# Patient Record
Sex: MALE | Race: WHITE | Employment: UNEMPLOYED | URBAN - METROPOLITAN AREA
[De-identification: names, ages, dates, MRNs, and addresses within clinical notes are randomized per-mention and may not be internally consistent; named-entity substitution may affect disease eponyms.]

---

## 2020-06-21 ENCOUNTER — APPOINTMENT (OUTPATIENT)
Dept: GENERAL RADIOLOGY | Age: 35
End: 2020-06-21

## 2020-06-21 ENCOUNTER — HOSPITAL ENCOUNTER (EMERGENCY)
Age: 35
Discharge: HOME | End: 2020-06-21
Attending: EMERGENCY MEDICINE

## 2020-06-21 VITALS
DIASTOLIC BLOOD PRESSURE: 79 MMHG | SYSTOLIC BLOOD PRESSURE: 125 MMHG | HEART RATE: 72 BPM | WEIGHT: 210 LBS | OXYGEN SATURATION: 99 % | HEIGHT: 67 IN | TEMPERATURE: 97.9 F | RESPIRATION RATE: 18 BRPM | BODY MASS INDEX: 32.96 KG/M2

## 2020-06-21 PROCEDURE — 73630 X-RAY EXAM OF FOOT: CPT

## 2020-06-21 PROCEDURE — 99283 EMERGENCY DEPT VISIT LOW MDM: CPT

## 2020-06-21 RX ORDER — CEPHALEXIN 250 MG/1
500 CAPSULE ORAL 3 TIMES DAILY
Qty: 42 CAPSULE | Refills: 0 | Status: SHIPPED | OUTPATIENT
Start: 2020-06-21 | End: 2020-06-28

## 2020-06-21 RX ORDER — SULFAMETHOXAZOLE AND TRIMETHOPRIM 800; 160 MG/1; MG/1
1 TABLET ORAL 2 TIMES DAILY
Qty: 20 TABLET | Refills: 0 | Status: SHIPPED | OUTPATIENT
Start: 2020-06-21 | End: 2020-07-01

## 2020-06-21 NOTE — ED PROVIDER NOTES
PERRLA. EOMI. No discharge from eyes. ENT:  No discharge from nose. OP clear. No tonsillar exudates. Neck:  Supple without lymphadenopathy. Pulmonary:   Non-labored breathing. Lungs CTAB. Cardiac:  Regular rate and rhythm. No murmurs, rubs, or gallops. Abdomen:  Soft. Non-tender. Non-distended. No rigidity/rebound/guarding  Musculoskeletal:  No long bone deformity. No CVA tenderness. Small area of erythema over the lateral dorsal right foot tracking down to a small area with minimal induration and fluctuance that is come to ahead. Vascular:  Extremities warm and perfused. Capillary refill <2seconds. Skin: As above  Neuro: Alert and oriented x 4. CN II-XII intact. 5/5 strength in all 4 distal extremities. Sensation grossly intact to light touch. Speech and mentation normal.  Extremities:  No peripheral edema. DiagnosticResults       RADIOLOGY:  XR FOOT RIGHT (MIN 3 VIEWS)   Final Result      No sign of any acute fracture or radiopaque foreign body. LABS:   No results found for this visit on 06/21/20. RECENT VITALS:  BP: 125/79, Temp: 97.9 °F (36.6 °C), Pulse: 72,Resp: 18, SpO2: 99 %     Procedures       ED Course     Nursing Notes, Past Medical Hx, Past Surgical Hx, Social Hx, Allergies, and Family Hx were reviewed. The patient was given the followingmedications:  Orders Placed This Encounter   Medications    sulfamethoxazole-trimethoprim (BACTRIM DS) 800-160 MG per tablet     Sig: Take 1 tablet by mouth 2 times daily for 10 days     Dispense:  20 tablet     Refill:  0    cephALEXin (KEFLEX) 250 MG capsule     Sig: Take 2 capsules by mouth 3 times daily for 7 days     Dispense:  42 capsule     Refill:  0       CONSULTS:  None    MEDICAL DECISION MAKING / ASSESSMENT / Xander Elicia is a 27 y.o. male who presented to the emergency department with Foot Injury   with a history and presentation as described above in HPI.  The patient was evaluated by myself and the ED

## 2020-06-21 NOTE — ED NOTES
Bed: A08-08  Expected date:   Expected time:   Means of arrival:   Comments:  Eliot Marks RN  06/21/20 1911

## 2020-10-15 ENCOUNTER — HOSPITAL ENCOUNTER (EMERGENCY)
Age: 35
Discharge: HOME | End: 2020-10-15
Attending: EMERGENCY MEDICINE

## 2020-10-15 VITALS
DIASTOLIC BLOOD PRESSURE: 73 MMHG | WEIGHT: 197 LBS | TEMPERATURE: 98.4 F | OXYGEN SATURATION: 100 % | SYSTOLIC BLOOD PRESSURE: 120 MMHG | BODY MASS INDEX: 30.92 KG/M2 | RESPIRATION RATE: 18 BRPM | HEART RATE: 83 BPM | HEIGHT: 67 IN

## 2020-10-15 LAB
ANION GAP SERPL CALCULATED.3IONS-SCNC: 8 MMOL/L (ref 3–16)
BASOPHILS ABSOLUTE: 0 K/UL (ref 0–0.2)
BASOPHILS RELATIVE PERCENT: 0.5 %
BUN BLDV-MCNC: 15 MG/DL (ref 7–20)
CALCIUM SERPL-MCNC: 9 MG/DL (ref 8.3–10.6)
CHLORIDE BLD-SCNC: 105 MMOL/L (ref 99–110)
CO2: 27 MMOL/L (ref 21–32)
CREAT SERPL-MCNC: 0.9 MG/DL (ref 0.9–1.3)
EOSINOPHILS ABSOLUTE: 0.2 K/UL (ref 0–0.6)
EOSINOPHILS RELATIVE PERCENT: 3.5 %
GFR AFRICAN AMERICAN: >60
GFR NON-AFRICAN AMERICAN: >60
GLUCOSE BLD-MCNC: 87 MG/DL (ref 70–99)
HCT VFR BLD CALC: 42.3 % (ref 40.5–52.5)
HEMOGLOBIN: 14.6 G/DL (ref 13.5–17.5)
LYMPHOCYTES ABSOLUTE: 1.3 K/UL (ref 1–5.1)
LYMPHOCYTES RELATIVE PERCENT: 20.7 %
MCH RBC QN AUTO: 29.4 PG (ref 26–34)
MCHC RBC AUTO-ENTMCNC: 34.6 G/DL (ref 31–36)
MCV RBC AUTO: 84.9 FL (ref 80–100)
MONOCYTES ABSOLUTE: 0.4 K/UL (ref 0–1.3)
MONOCYTES RELATIVE PERCENT: 6.1 %
NEUTROPHILS ABSOLUTE: 4.3 K/UL (ref 1.7–7.7)
NEUTROPHILS RELATIVE PERCENT: 69.2 %
PDW BLD-RTO: 12.6 % (ref 12.4–15.4)
PLATELET # BLD: 202 K/UL (ref 135–450)
PMV BLD AUTO: 8.7 FL (ref 5–10.5)
POTASSIUM REFLEX MAGNESIUM: 4.1 MMOL/L (ref 3.5–5.1)
RBC # BLD: 4.98 M/UL (ref 4.2–5.9)
SODIUM BLD-SCNC: 140 MMOL/L (ref 136–145)
WBC # BLD: 6.3 K/UL (ref 4–11)

## 2020-10-15 PROCEDURE — 80048 BASIC METABOLIC PNL TOTAL CA: CPT

## 2020-10-15 PROCEDURE — 99283 EMERGENCY DEPT VISIT LOW MDM: CPT

## 2020-10-15 PROCEDURE — 85025 COMPLETE CBC W/AUTO DIFF WBC: CPT

## 2020-10-15 RX ORDER — DOCUSATE SODIUM 100 MG/1
100 CAPSULE, LIQUID FILLED ORAL 2 TIMES DAILY
Qty: 30 CAPSULE | Refills: 0 | Status: SHIPPED | OUTPATIENT
Start: 2020-10-15

## 2020-10-15 ASSESSMENT — ENCOUNTER SYMPTOMS
SHORTNESS OF BREATH: 0
ANAL BLEEDING: 1
VOMITING: 0
NAUSEA: 0
ABDOMINAL PAIN: 0
BACK PAIN: 0
RECTAL PAIN: 1
DIARRHEA: 0
COLOR CHANGE: 0
EYE REDNESS: 0

## 2020-10-16 NOTE — ED PROVIDER NOTES
ED Attending Attestation Note     Date of evaluation: 10/15/2020    This patient was seen by the advance practice provider. I have seen and examined the patient, agree with the workup, evaluation, management and diagnosis. The care plan has been discussed. My assessment reveals with a thrombosed external hemorrhoid and no active bleeding.         Mei Veliz MD  10/15/20 [id number]

## 2020-10-16 NOTE — ED TRIAGE NOTES
Patient states that he has had bright red blood coming from his rectum and in his stool since bout 0100 today. Patient is hard to get a history from, but also admits to \"smoking a joint\" on his way here.

## 2020-10-16 NOTE — ED PROVIDER NOTES
[address] ENCOUNTER          PHYSICIAN ASSISTANT NOTE       Date of evaluation: 10/15/2020    Chief Complaint     Rectal Bleeding      History of Present Illness     Jimenez Rojas is a 38 y.o. male who presents with complaint of rectal bleeding. Patient states that yesterday he had a bowel movement and when he wiped he had a significant amount of blood and clots on the toilet paper. He states he was unable to see in the toilet because the toilet he was using had continuous flow and so all he saw was after he wiped and is unsure if the toilet contained blood. He states this occurred a second time after having a bowel movement and again. Today he was showering and he noticed an area that was swollen and painful near his anus. He has never noticed this in the past.  He states that he did not notice any pain with bowel movement yesterday and states he was not straining. Patient denies recent fevers or chills, abdominal pain, nausea or vomiting. He admits to frequent lightheadedness which has been going on for months and states that he is a  and gets spells of lightheadedness and sometimes has to sit down or lay down. He denies chest pain or shortness of breath. Review of Systems     Review of Systems   Constitutional: Negative for chills and fever. Eyes: Negative for redness. Respiratory: Negative for shortness of breath. Cardiovascular: Negative for chest pain. Gastrointestinal: Positive for anal bleeding and rectal pain. Negative for abdominal pain, diarrhea, nausea and vomiting. Musculoskeletal: Negative for back pain and myalgias. Skin: Negative for color change and wound. Neurological: Positive for light-headedness. Negative for weakness, numbness and headaches. Hematological: Does not bruise/bleed easily. Psychiatric/Behavioral: Negative for confusion. All other systems reviewed and are negative.       Past Medical, Surgical, Family, and Social History     He has a past medical history of Chlamydia, Drug abuse ([address] Hypertension, MRSA (methicillin resistant staph aureus) culture positive, and TB (tuberculosis). He has a past surgical history that includes hernia repair and Tonsillectomy. His family history is not on file. He reports that he has quit smoking. He has never used smokeless tobacco. He reports current alcohol use. He reports current drug use. Drugs: Marijuana, IV, Cocaine, and Methamphetamines. Medications     Previous Medications    No medications on file       Allergies     He is allergic to erythromycin and sulfa antibiotics. Physical Exam     INITIAL VITALS: BP: (!) 126/95, Temp: 98.4 °F (36.9 °C), Pulse: 83, Resp: 18, SpO2: 100 %  Physical Exam  Vitals signs and nursing note reviewed. Constitutional:       General: He is not in acute distress. Appearance: He is well-developed. He is not diaphoretic. HENT:      Head: Normocephalic and atraumatic. Mouth/Throat:      Mouth: Mucous membranes are dry. Eyes:      Conjunctiva/sclera: Conjunctivae normal.   Cardiovascular:      Rate and Rhythm: Normal rate and regular rhythm. Heart sounds: Normal heart sounds. Pulmonary:      Effort: Pulmonary effort is normal. No respiratory distress. Breath sounds: Normal breath sounds. Abdominal:      Palpations: Abdomen is soft. Tenderness: There is no abdominal tenderness. Genitourinary:     Rectum: Tenderness and external hemorrhoid (thrombosed hemorrhoid at the 9 o'clock position with tenderness within the anus adjacent) present. No mass. Normal anal tone. Comments: BRBPR noted on digital rectal exam  Musculoskeletal: Normal range of motion. General: No tenderness. Skin:     General: Skin is warm and dry. Neurological:      Mental Status: He is alert and oriented to person, place, and time. Psychiatric:         Behavior: Behavior normal.         Thought Content:  Thought content normal. Judgment: Judgment normal.         Diagnostic Results     RADIOLOGY:  No orders to display       LABS:   Results for orders placed or performed during the hospital encounter of 10/15/20   CBC Auto Differential   Result Value Ref Range    WBC 6.3 4.0 - 11.0 K/uL    RBC 4.98 4.20 - 5.90 M/uL    Hemoglobin 14.6 13.5 - 17.5 g/dL    Hematocrit 42.3 40.5 - 52.5 %    MCV 84.9 80.0 - 100.0 fL    MCH 29.4 26.0 - 34.0 pg    MCHC 34.6 31.0 - 36.0 g/dL    RDW 12.6 12.4 - 15.4 %    Platelets 826 827 - 118 K/uL    MPV 8.7 5.0 - 10.5 fL    Neutrophils % 69.2 %    Lymphocytes % 20.7 %    Monocytes % 6.1 %    Eosinophils % 3.5 %    Basophils % 0.5 %    Neutrophils Absolute 4.3 1.7 - 7.7 K/uL    Lymphocytes Absolute 1.3 1.0 - 5.1 K/uL    Monocytes Absolute 0.4 0.0 - 1.3 K/uL    Eosinophils Absolute 0.2 0.0 - 0.6 K/uL    Basophils Absolute 0.0 0.0 - 0.2 K/uL   Basic Metabolic Panel w/ Reflex to MG   Result Value Ref Range    Sodium 140 136 - 145 mmol/L    Potassium reflex Magnesium 4.1 3.5 - 5.1 mmol/L    Chloride 105 99 - 110 mmol/L    CO2 27 21 - 32 mmol/L    Anion Gap 8 3 - 16    Glucose 87 70 - 99 mg/dL    BUN 15 7 - 20 mg/dL    CREATININE 0.9 0.9 - 1.3 mg/dL    GFR Non-African American >60 >60    GFR African American >60 >60    Calcium 9.0 8.3 - 10.6 mg/dL       ED BEDSIDE ULTRASOUND:      RECENT VITALS:  BP: 120/73, Temp: 98.4 °F (36.9 °C), Pulse: 83, Resp: 18, SpO2: 100 %     Procedures         ED Course     Nursing Notes, Past Medical Hx,Past Surgical Hx, Social Hx, Allergies, and Family Hx were reviewed. The patient was given the following medications:  No orders of the defined types were placed in this encounter. CONSULTS:  None    MEDICAL DECISION MAKING / ASSESSMENT / PLAN     Jordin Juarez is a 38 y.o. male [facility] emergency department with complaint of rectal bleeding. Patient is noted to have bright red blood per rectum and a firm hemorrhoid at the 9 o'clock position that is mildly tender to palpation. Abdomen is soft. He has tenderness on his rectal exam adjacent to the hemorrhoid with no obvious abnormalities. Vital signs are normal.    He does report months of lightheadedness. CBC and renal were obtained. Mucous membranes are dry and patient admits that as a  he does not always have water or a drink with him. CBC and BMP are normal.  At this point I feel that the patient can be managed as an outpatient and he will be given contact information for colorectal surgery. Patient was given prescription for Colace to ensure he maintains soft bowel movements to prevent straining or constipation. Patient is in agreement with the plan and will try to stay well-hydrated. If he has any new or worsening symptoms he will return to the emergency room. This patient was also evaluated by the attending physician. All care plans were discussed and agreed upon. Clinical Impression     1.  External hemorrhoid        Disposition     PATIENT REFERRED TO:  Nicholas Evangelista MD  [place] [address] [address] [facility]  [address] [facility] [address]            DISCHARGE MEDICATIONS:  New Prescriptions    No medications on file       DISPOSITION Decision To Discharge 10/15/2020 09:13:24 PM        [place], [place]  10/16/20 5102

## 2020-10-16 NOTE — ED NOTES
Discharge instructions and prescriptions reviewed with patient. Pt verbalized understanding. IV d/c. Pt tolerated well. Pt discharged home by self.        Maggie Reza RN  10/15/20 [id number]

## 2022-08-07 ENCOUNTER — HOSPITAL ENCOUNTER (EMERGENCY)
Age: 37
Discharge: HOME | End: 2022-08-07
Attending: EMERGENCY MEDICINE

## 2022-08-07 ENCOUNTER — APPOINTMENT (OUTPATIENT)
Dept: GENERAL RADIOLOGY | Age: 37
End: 2022-08-07

## 2022-08-07 VITALS
SYSTOLIC BLOOD PRESSURE: 142 MMHG | RESPIRATION RATE: 20 BRPM | HEART RATE: 91 BPM | TEMPERATURE: 98.7 F | DIASTOLIC BLOOD PRESSURE: 93 MMHG | OXYGEN SATURATION: 100 %

## 2022-08-07 DIAGNOSIS — M25.551 HIP PAIN, ACUTE, RIGHT: ICD-10-CM

## 2022-08-07 DIAGNOSIS — M79.601 RIGHT ARM PAIN: ICD-10-CM
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
## 2022-08-07 DIAGNOSIS — M79.641 RIGHT HAND PAIN: ICD-10-CM

## 2022-08-07 DIAGNOSIS — S51.011A ELBOW LACERATION, RIGHT, INITIAL ENCOUNTER: ICD-10-CM

## 2022-08-07 PROCEDURE — 73090 X-RAY EXAM OF FOREARM: CPT

## 2022-08-07 PROCEDURE — 73080 X-RAY EXAM OF ELBOW: CPT

## 2022-08-07 PROCEDURE — 73130 X-RAY EXAM OF HAND: CPT

## 2022-08-07 PROCEDURE — 99283 EMERGENCY DEPT VISIT LOW MDM: CPT

## 2022-08-07 PROCEDURE — 71046 X-RAY EXAM CHEST 2 VIEWS: CPT

## 2022-08-07 PROCEDURE — 12001 RPR S/N/AX/GEN/TRNK 2.5CM/<: CPT

## 2022-08-07 PROCEDURE — 2500000003 HC RX 250 WO HCPCS

## 2022-08-07 PROCEDURE — 6370000000 HC RX 637 (ALT 250 FOR IP)

## 2022-08-07 RX ORDER — ACETAMINOPHEN 500 MG
1000 TABLET ORAL
Status: COMPLETED | OUTPATIENT
Start: 2022-08-07 | End: 2022-08-07

## 2022-08-07 RX ADMIN — ACETAMINOPHEN 1000 MG: 500 TABLET ORAL at 18:25

## 2022-08-07 RX ADMIN — LIDOCAINE HYDROCHLORIDE 20 ML: 10; .005 INJECTION, SOLUTION EPIDURAL; INFILTRATION; INTRACAUDAL; PERINEURAL at 19:33

## 2022-08-07 ASSESSMENT — PAIN DESCRIPTION - LOCATION: LOCATION: ARM;RIB CAGE;HIP

## 2022-08-07 ASSESSMENT — PAIN SCALES - GENERAL: PAINLEVEL_OUTOF10: 8

## 2022-08-07 ASSESSMENT — PAIN DESCRIPTION - ORIENTATION: ORIENTATION: RIGHT

## 2022-08-07 ASSESSMENT — PAIN DESCRIPTION - PAIN TYPE: TYPE: ACUTE PAIN

## 2022-08-07 ASSESSMENT — PAIN - FUNCTIONAL ASSESSMENT: PAIN_FUNCTIONAL_ASSESSMENT: 0-10

## 2022-08-07 ASSESSMENT — PAIN DESCRIPTION - DESCRIPTORS: DESCRIPTORS: ACHING

## 2022-08-07 NOTE — Clinical Note
Isela Cunningham was seen and treated in our emergency department on 8/7/2022. He may return to work on 08/09/2022. Please excuse Isela Cunningham from work for medical reasons. He may return on 8/9/2022 without any specific restrictions. If you have any questions or concerns, please don't hesitate to call.       Sony Guzman MD

## 2022-08-07 NOTE — Clinical Note
Cathy Rush was seen and treated in our emergency department on 8/7/2022. He may return to work on 08/09/2022. Please excuse Cathy Rush from work for medical reasons. He may return on 8/9/2022 without any specific restrictions. If you have any questions or concerns, please don't hesitate to call.       Dmitry Irvin MD

## 2022-08-07 NOTE — ED PROVIDER NOTES
The pain  ED Attending Attestation Note     Date of evaluation: 8/7/2022    This patient was seen by the resident. I have seen and examined the patient, agree with the workup, evaluation, management and diagnosis. The care plan has been discussed. My assessment reveals patient is alert, sitting up in bed, and some pain in his right ribs with deep inspiration and on palpation of his right hand with palpation, no obvious deformities. Abdomen is soft no cervical thoracic or lumbar spine pain.      Venita Hutson MD  08/07/22 0008

## 2022-08-07 NOTE — DISCHARGE INSTRUCTIONS
X-rays of your chest/ribs and right elbow, forearm, and hand were all normal and didn't show any broken bones. We cleaned out the wound on your elbow and put in loose stitches in there. Try to keep this area clean and dry, changing the bandage as needed. These stiches will need to be removed in 10-14 days. If you have increasing pain here or fevers, you should seek medical care as you may need antibiotics. You should expect to feel sore for several days. You can take Tylenol and an NSAID (such as ibuprofen or naproxen) over the counter for your pain. Return to the emergency department if you have worsening of your symptoms, difficulty breathing, or other worrisome symptoms.

## 2022-08-07 NOTE — ED PROVIDER NOTES
[address] [facility] RESIDENT NOTE       Date of evaluation: 8/7/2022    Chief Complaint     Fall Olena Alford off roof about 12ft onto concrete. Injury to right high to right ribs and right arm)      of Present Illness     Sonny Frankel is a 39 y.o. right-handed male presenting after a fall off a roof. He works as a . Reports mechanical fall 25 minutes ago off a roof. [employer] about 10-12 feet and landed on the ground on his right side. Has pain in right lateral upper leg/hip, left trunk (non-pleuritic), and left arm (elbow and palm). Has bleeding wound on right elbow. No head trauma, LOC, neck pain, abdominal pain, weakness/tingling/numbness. Ambulates independently without difficulty. No other associated symptoms. No h/o DM, not immunocompromised. Has not yet take anything for pain. Says he does not want opioids given prior history of abuse    Review of Systems     All other systems reviewed and negative except as noted above    Consitutional: negative for fevers/chills, malaise, diaphoresis, unexpected change in weight. HEENT: negative for facial trauma, epistaxis, vision changes. Respiratory: negative for cough, shortness of breath, stridor, wheezing. Cardio: negative for mid or left sided chest pain, leg swelling, palpitations. GI: negative for abdominal pain, nausea/vomiting, changes in bowel habits, blood in stool. : negative for dysuria, hematuria, or difficulty with urination. Skin: negative for rash, color change. Musc: negative for gait problem. Neuro: negative for new headache, numbness/tingling/weakness, syncope, lightheadedness. Past Medical, Surgical, Family, and Social History     He has a past medical history of Chlamydia, Drug abuse in remission ([facility] [address] Hypertension, MRSA (methicillin resistant staph aureus) culture positive, and TB (tuberculosis). He has a past surgical history that includes hernia repair and Tonsillectomy.     His family history is not on file. He reports that he has been smoking cigars. He has never used smokeless tobacco. He reports current alcohol use. He reports that he does not currently use drugs after having used the following drugs: Marijuana Maria Esther Sven), IV, Cocaine, and Methamphetamines (Crystal Meth). Medications     Discharge Medication List as of 8/7/2022  8:01 PM        CONTINUE these medications which have NOT CHANGED    Details   docusate sodium (COLACE) 100 MG capsule Take 1 capsule by mouth 2 times daily, Disp-30 capsule,R-0Print             Allergies     He is allergic to erythromycin and sulfa antibiotics. Physical Exam     INITIAL VITALS: BP: (!) 147/91, Temp: 98.7 °F (37.1 °C), Heart Rate: 94, Resp: 18, SpO2: 99 %     Triage and nursing notes reviewed. General: well-appearing, no acute distress, nontoxic  Head: atraumatic, normocephalic, no wounds or palpable deformities  Eyes: PERRL, EOM intact, sclerae normal  ENT: No facial trauma, wound, or deformity  Neck: supple, ROM intact, no point C-spine tenderness or deformity  Resp: breathing comfortably on room air, speaks in full sentences, no splinting  Cards: regular rate and rhythm  Vasc: extremities warm and well-perfused  Abd: soft, non-distended, non-tender without peritoneal signs  Back: No T or L-spine tenderness to palpation or deformity, no soft tissue tenderness to palpation, range of motion intact  Ext/MSK/Skin: No long bone deformity, ROM of all joints intact. - RUE: There is a open wound (<2cm in size) over the right olecranon process, subcutaneous tissue and fat seen, bleeding easily controlled. Endorses soreness in the right hand, not reproduced by palpation, no tenderness at the anatomical snuffbox, no pain with axial loading of the thumb. Range of motion of all joints intact, including fine motor movements of the hand (okay sign, peace sign, cross fingers, thumb to pinky).   Sensation intact light touch throughout right upper extremity and on all aspects of the hand.  - Superficial red abrasions without bleeding across the right lateral trunk, right lateral hip, right lateral upper thigh without significant tenderness to palpation, no open wounds, no bony tenderness. Neurovascularly intact throughout right lower extremity.  -No other traumatic injuries appreciated on exam.  Neuro: Alert and oriented, no focal deficit, GCS 15, coordination, sensation, strength intact and symmetric, normal gait, ambulates independently without difficulty    DiagnosticResults     RADIOLOGY:  XR CHEST (2 VW)   Final Result      No evidence for acute cardiopulmonary disease. No evidence for fracture            XR ELBOW RIGHT (MIN 3 VIEWS)   Final Result      No fracture      XR RADIUS ULNA RIGHT (2 VIEWS)   Final Result      No fracture      XR HAND RIGHT (MIN 3 VIEWS)   Final Result      No fracture          LABS:   No results found for this visit on 08/07/22. ED BEDSIDE ULTRASOUND:  No results found. RECENT VITALS:  BP: (!) 142/93, Temp: 98.7 °F (37.1 °C), Heart Rate: 91,Resp: 20, SpO2: 100 %     Procedures     Lac Repair    Date/Time: 8/7/2022 7:23 PM  Performed by: Lakia Haney MD  Authorized by: Katelynn Garcia MD     Consent:     Consent obtained:  Verbal    Consent given by:  Patient    Risks, benefits, and alternatives were discussed: yes    Universal protocol:     Patient identity confirmed:  Verbally with patient and arm band  Anesthesia:     Anesthesia method:  Local infiltration    Local anesthetic:  Lidocaine 1% WITH epi  Laceration details:     Location: right elbow.     Length (cm):  1.8  Pre-procedure details:     Preparation:  Patient was prepped and draped in usual sterile fashion and imaging obtained to evaluate for foreign bodies  Exploration:     Hemostasis achieved with:  Direct pressure    Imaging obtained: x-ray      Imaging outcome: foreign body not noted      Wound exploration: wound explored through full range of motion and entire depth of wound visualized      Wound extent: no foreign bodies/material noted, no nerve damage noted, no tendon damage noted, no underlying fracture noted and no vascular damage noted    Treatment:     Area cleansed with:  Chlorhexidine    Amount of cleaning:  Extensive    Irrigation solution:  Sterile water    Irrigation volume:  500cc    Irrigation method:  Syringe    Foreign body removal: no foriegn bodies. Debridement:  None    Undermining:  None    Scar revision: no      Layers/structures repaired:  Deep subcutaneous and deep dermal/superficial fascia  Skin repair:     Repair method:  Sutures    Suture size:  4-0    Suture material:  Prolene    Suture technique:  Simple interrupted    Number of sutures:  2  Approximation:     Approximation:  Loose  Repair type:     Repair type:  Simple  Post-procedure details:     Dressing:  Non-adherent dressing (Gauze and kerlix)    Procedure completion:  Tolerated well, no immediate complications      ED Course     Nursing Notes, Past Medical Hx, Past Surgical Hx, Social Hx, Allergies, and Family Hx were reviewed. The patient was given the followingmedications:  Orders Placed This Encounter   Medications    acetaminophen (TYLENOL) tablet 1,000 mg    lidocaine-EPINEPHrine 1 percent-1:[id number] injection 20 mL       CONSULTS:  None    MEDICAL DECISION MAKING / ASSESSMENT / Aydeel Jes is a 39 y.o. male with history as described above presenting with injuries after falling off a roof. On arrival to the ED, in no acute distress, triage vitals within normal limits with exception of slightly elevated blood pressure. Physical exam with injuries of RUE, right trunk, and right hip as described above, but otherwise reassuring against any significant traumatic injuries. No signs or symptoms concerning for scaphoid fracture. X-rays of the chest and right upper extremity negative for acute injuries. Elbow laceration thoroughly irrigated, no foreign bodies seen. Given wound was dirty upon arrival and patient works in physical labor, placed loose sutures with wound left partially open. Wound care instructions provided. Will follow-up with PCP or come to ED if develops worsening pain or signs of infection. Patient is appropriate for discharge home. Given return precautions. Patient voices agreement and understanding with plan. This patient was also evaluated by the attending physician. All care plans werediscussed and agreed upon. Clinical Impression     1. Fall from roof, initial encounter    2. Right arm pain    3. Right hand pain    4.  Hip pain, acute, right    5. Elbow laceration, right, initial encounter        Disposition     PATIENT REFERRED TO:  [facility] Emergency Department  [address] [place] [address]  [address]  [phone]    As needed, If symptoms worsen    Referring Not In System      As needed, If symptoms worsen    DISCHARGE MEDICATIONS:  Discharge Medication List as of 8/7/2022  8:01 PM          DISPOSITION Decision To Discharge 08/07/2022 08:10:13 PM       Silver Romero MD  Resident  08/07/22 2011

## 2024-02-12 ENCOUNTER — HOSPITAL ENCOUNTER (INPATIENT)
Age: 39
LOS: 1 days | Discharge: HOME | DRG: 914 | End: 2024-02-13
Attending: EMERGENCY MEDICINE | Admitting: HOSPITALIST

## 2024-02-12 ENCOUNTER — APPOINTMENT (OUTPATIENT)
Dept: GENERAL RADIOLOGY | Age: 39
DRG: 914 | End: 2024-02-12
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
## 2024-02-12 DIAGNOSIS — S62.242B OPEN DISPLACED FRACTURE OF SHAFT OF FIRST METACARPAL BONE OF LEFT HAND, INITIAL ENCOUNTER: ICD-10-CM

## 2024-02-12 DIAGNOSIS — S61.439A: Primary | ICD-10-CM

## 2024-02-12 PROBLEM — F12.10 CANNABIS ABUSE: Status: ACTIVE | Noted: 2019-01-03

## 2024-02-12 PROBLEM — S61.432A: Status: ACTIVE | Noted: 2024-02-12

## 2024-02-12 PROBLEM — S61.442A PUNCTURE WOUND OF LEFT HAND WITH FOREIGN BODY: Status: ACTIVE | Noted: 2024-02-12

## 2024-02-12 PROCEDURE — 6370000000 HC RX 637 (ALT 250 FOR IP)

## 2024-02-12 PROCEDURE — 6360000002 HC RX W HCPCS

## 2024-02-12 PROCEDURE — 99285 EMERGENCY DEPT VISIT HI MDM: CPT

## 2024-02-12 PROCEDURE — 96365 THER/PROPH/DIAG IV INF INIT: CPT

## 2024-02-12 PROCEDURE — 73130 X-RAY EXAM OF HAND: CPT

## 2024-02-12 PROCEDURE — 99223 1ST HOSP IP/OBS HIGH 75: CPT | Performed by: HOSPITALIST

## 2024-02-12 PROCEDURE — 1200000000 HC SEMI PRIVATE

## 2024-02-12 PROCEDURE — 6370000000 HC RX 637 (ALT 250 FOR IP): Performed by: NURSE PRACTITIONER

## 2024-02-12 PROCEDURE — 2580000003 HC RX 258

## 2024-02-12 RX ORDER — POTASSIUM CHLORIDE 20 MEQ/1
40 TABLET, EXTENDED RELEASE ORAL PRN
Status: DISCONTINUED | OUTPATIENT
Start: 2024-02-12 | End: 2024-02-13 | Stop reason: HOSPADM

## 2024-02-12 RX ORDER — SODIUM CHLORIDE 9 MG/ML
INJECTION, SOLUTION INTRAVENOUS PRN
Status: DISCONTINUED | OUTPATIENT
Start: 2024-02-12 | End: 2024-02-13 | Stop reason: HOSPADM

## 2024-02-12 RX ORDER — MAGNESIUM HYDROXIDE/ALUMINUM HYDROXICE/SIMETHICONE 120; 1200; 1200 MG/30ML; MG/30ML; MG/30ML
30 SUSPENSION ORAL EVERY 6 HOURS PRN
Status: DISCONTINUED | OUTPATIENT
Start: 2024-02-12 | End: 2024-02-13 | Stop reason: HOSPADM

## 2024-02-12 RX ORDER — SODIUM CHLORIDE 0.9 % (FLUSH) 0.9 %
5-40 SYRINGE (ML) INJECTION PRN
Status: DISCONTINUED | OUTPATIENT
Start: 2024-02-12 | End: 2024-02-13 | Stop reason: HOSPADM

## 2024-02-12 RX ORDER — ACETAMINOPHEN 500 MG
1000 TABLET ORAL
Status: COMPLETED | OUTPATIENT
Start: 2024-02-12 | End: 2024-02-12

## 2024-02-12 RX ORDER — SODIUM CHLORIDE 0.9 % (FLUSH) 0.9 %
5-40 SYRINGE (ML) INJECTION EVERY 12 HOURS SCHEDULED
Status: DISCONTINUED | OUTPATIENT
Start: 2024-02-12 | End: 2024-02-13 | Stop reason: HOSPADM

## 2024-02-12 RX ORDER — SODIUM CHLORIDE 9 MG/ML
INJECTION, SOLUTION INTRAVENOUS CONTINUOUS
Status: DISCONTINUED | OUTPATIENT
Start: 2024-02-12 | End: 2024-02-13 | Stop reason: HOSPADM

## 2024-02-12 RX ORDER — ONDANSETRON 2 MG/ML
4 INJECTION INTRAMUSCULAR; INTRAVENOUS EVERY 6 HOURS PRN
Status: DISCONTINUED | OUTPATIENT
Start: 2024-02-12 | End: 2024-02-13 | Stop reason: HOSPADM

## 2024-02-12 RX ORDER — IBUPROFEN 400 MG/1
800 TABLET ORAL ONCE
Status: COMPLETED | OUTPATIENT
Start: 2024-02-12 | End: 2024-02-12

## 2024-02-12 RX ORDER — ACETAMINOPHEN 650 MG/1
650 SUPPOSITORY RECTAL EVERY 6 HOURS PRN
Status: DISCONTINUED | OUTPATIENT
Start: 2024-02-12 | End: 2024-02-13 | Stop reason: HOSPADM

## 2024-02-12 RX ORDER — POTASSIUM CHLORIDE 7.45 MG/ML
10 INJECTION INTRAVENOUS PRN
Status: DISCONTINUED | OUTPATIENT
Start: 2024-02-12 | End: 2024-02-13 | Stop reason: HOSPADM

## 2024-02-12 RX ORDER — POLYETHYLENE GLYCOL 3350 17 G/17G
17 POWDER, FOR SOLUTION ORAL DAILY PRN
Status: DISCONTINUED | OUTPATIENT
Start: 2024-02-12 | End: 2024-02-13 | Stop reason: HOSPADM

## 2024-02-12 RX ORDER — ONDANSETRON 4 MG/1
4 TABLET, ORALLY DISINTEGRATING ORAL EVERY 8 HOURS PRN
Status: DISCONTINUED | OUTPATIENT
Start: 2024-02-12 | End: 2024-02-13 | Stop reason: HOSPADM

## 2024-02-12 RX ORDER — ACETAMINOPHEN 325 MG/1
650 TABLET ORAL EVERY 6 HOURS PRN
Status: DISCONTINUED | OUTPATIENT
Start: 2024-02-12 | End: 2024-02-13 | Stop reason: HOSPADM

## 2024-02-12 RX ORDER — MAGNESIUM SULFATE IN WATER 40 MG/ML
2000 INJECTION, SOLUTION INTRAVENOUS PRN
Status: DISCONTINUED | OUTPATIENT
Start: 2024-02-12 | End: 2024-02-13 | Stop reason: HOSPADM

## 2024-02-12 RX ORDER — KETOROLAC TROMETHAMINE 30 MG/ML
30 INJECTION, SOLUTION INTRAMUSCULAR; INTRAVENOUS EVERY 6 HOURS PRN
Status: DISCONTINUED | OUTPATIENT
Start: 2024-02-12 | End: 2024-02-13 | Stop reason: HOSPADM

## 2024-02-12 RX ADMIN — CEFAZOLIN 2000 MG: 2 INJECTION, POWDER, FOR SOLUTION INTRAMUSCULAR; INTRAVENOUS at 22:38

## 2024-02-12 RX ADMIN — IBUPROFEN 800 MG: 400 TABLET, FILM COATED ORAL at 15:34

## 2024-02-12 RX ADMIN — IBUPROFEN 600 MG: 200 TABLET, FILM COATED ORAL at 20:15

## 2024-02-12 RX ADMIN — ACETAMINOPHEN 1000 MG: 500 TABLET ORAL at 20:15

## 2024-02-12 NOTE — ED TRIAGE NOTES
[facility] Emergency Department  MEDICAL SCREENING EXAM    Date of Service: 2/12/2024    Reason for Visit: Finger Injury (Nail in L thumb from leonor gun. Up to date on tetanus )        Patient History, Brief Exam, and Initial Assessment     Abbreviated HPI: Son Palm is a 38 y.o. male presenting with a nail gun through his left/nondominant hand.  Nail noted at the dorsal base of the left thumb through the thenar eminence.  No exit wound noted.  Patient complains of numbness and tingling of the thumb, as well as pain whenever the area is touched.  Does report that he worked for 1 hour after this incident happened.  States tetanus is up-to-date.  Patient is 3 years sober from drug abuse; requesting only anti-inflammatories for pain at this time.    INITIAL VITALS: BP: (!) 151/94, Temp: 98 °F (36.7 °C), Pulse: 82, Respirations: 18, SpO2: 100 %    Physical Exam  Musculoskeletal:         General: Tenderness present.      Comments: Nail present to the base of the left thumb extending into the thenar eminence, significant pain with any attempted movement, brisk cap refill in the thumb, intact sensation throughout palpation of the thumb.           Plan     Patient was evaluated in the REU for a medical screening exam.  To further evaluate the presenting complaints, the following orders have been placed:  Orders Placed This Encounter   Procedures    XR HAND LEFT (MIN 3 VIEWS)        See primary provider's note for full details and final disposition.     Current Facility-Administered Medications:   Orders Placed This Encounter   Medications    ibuprofen (ADVIL;MOTRIN) tablet 800 mg         REU Dispo     Stable for lobby while awaiting ED bed      Relevant Medical History     Past Medical History:   Diagnosis Date    Chlamydia 07/31/2016    Drug abuse in remission (HCC)     In remission since early 2021    Hypertension     MRSA (methicillin resistant staph aureus) culture positive     TB (tuberculosis)     s/p 9 mo

## 2024-02-13 ENCOUNTER — ANESTHESIA EVENT (OUTPATIENT)
Dept: OPERATING ROOM | Age: 39
DRG: 914 | End: 2024-02-13

## 2024-02-13 ENCOUNTER — APPOINTMENT (OUTPATIENT)
Dept: GENERAL RADIOLOGY | Age: 39
DRG: 914 | End: 2024-02-13
Attending: STUDENT IN AN ORGANIZED HEALTH CARE EDUCATION/TRAINING PROGRAM

## 2024-02-13 ENCOUNTER — ANESTHESIA (OUTPATIENT)
Dept: OPERATING ROOM | Age: 39
DRG: 914 | End: 2024-02-13

## 2024-02-13 VITALS
HEART RATE: 62 BPM | DIASTOLIC BLOOD PRESSURE: 61 MMHG | SYSTOLIC BLOOD PRESSURE: 120 MMHG | OXYGEN SATURATION: 97 % | BODY MASS INDEX: 34.53 KG/M2 | TEMPERATURE: 96.7 F | WEIGHT: 220 LBS | HEIGHT: 67 IN | RESPIRATION RATE: 16 BRPM

## 2024-02-13 LAB
ANION GAP SERPL CALCULATED.3IONS-SCNC: 10 MMOL/L (ref 3–16)
BASOPHILS # BLD: 0 K/UL (ref 0–0.2)
BASOPHILS NFR BLD: 0.6 %
BUN SERPL-MCNC: 29 MG/DL (ref 7–20)
CALCIUM SERPL-MCNC: 8.6 MG/DL (ref 8.3–10.6)
CHLORIDE SERPL-SCNC: 104 MMOL/L (ref 99–110)
CO2 SERPL-SCNC: 23 MMOL/L (ref 21–32)
CREAT SERPL-MCNC: 1.2 MG/DL (ref 0.9–1.3)
DEPRECATED RDW RBC AUTO: 14.1 % (ref 12.4–15.4)
EOSINOPHIL # BLD: 0.2 K/UL (ref 0–0.6)
EOSINOPHIL NFR BLD: 2.7 %
GFR SERPLBLD CREATININE-BSD FMLA CKD-EPI: >60 ML/MIN/{1.73_M2}
GLUCOSE SERPL-MCNC: 128 MG/DL (ref 70–99)
HCT VFR BLD AUTO: 42.3 % (ref 40.5–52.5)
HGB BLD-MCNC: 14.6 G/DL (ref 13.5–17.5)
LYMPHOCYTES # BLD: 1.6 K/UL (ref 1–5.1)
LYMPHOCYTES NFR BLD: 24.5 %
MCH RBC QN AUTO: 28.4 PG (ref 26–34)
MCHC RBC AUTO-ENTMCNC: 34.5 G/DL (ref 31–36)
MCV RBC AUTO: 82.2 FL (ref 80–100)
MONOCYTES # BLD: 0.5 K/UL (ref 0–1.3)
MONOCYTES NFR BLD: 8.3 %
NEUTROPHILS # BLD: 4.1 K/UL (ref 1.7–7.7)
NEUTROPHILS NFR BLD: 63.9 %
PLATELET # BLD AUTO: 213 K/UL (ref 135–450)
PMV BLD AUTO: 8.5 FL (ref 5–10.5)
POTASSIUM SERPL-SCNC: 3.7 MMOL/L (ref 3.5–5.1)
RBC # BLD AUTO: 5.14 M/UL (ref 4.2–5.9)
SODIUM SERPL-SCNC: 137 MMOL/L (ref 136–145)
WBC # BLD AUTO: 6.4 K/UL (ref 4–11)

## 2024-02-13 PROCEDURE — 7100000001 HC PACU RECOVERY - ADDTL 15 MIN: Performed by: STUDENT IN AN ORGANIZED HEALTH CARE EDUCATION/TRAINING PROGRAM

## 2024-02-13 PROCEDURE — 85025 COMPLETE CBC W/AUTO DIFF WBC: CPT

## 2024-02-13 PROCEDURE — 6360000002 HC RX W HCPCS: Performed by: STUDENT IN AN ORGANIZED HEALTH CARE EDUCATION/TRAINING PROGRAM

## 2024-02-13 PROCEDURE — 80048 BASIC METABOLIC PNL TOTAL CA: CPT

## 2024-02-13 PROCEDURE — 73120 X-RAY EXAM OF HAND: CPT

## 2024-02-13 PROCEDURE — 6360000002 HC RX W HCPCS: Performed by: NURSE ANESTHETIST, CERTIFIED REGISTERED

## 2024-02-13 PROCEDURE — 2580000003 HC RX 258: Performed by: NURSE ANESTHETIST, CERTIFIED REGISTERED

## 2024-02-13 PROCEDURE — 6360000002 HC RX W HCPCS: Performed by: INTERNAL MEDICINE

## 2024-02-13 PROCEDURE — 2709999900 HC NON-CHARGEABLE SUPPLY: Performed by: STUDENT IN AN ORGANIZED HEALTH CARE EDUCATION/TRAINING PROGRAM

## 2024-02-13 PROCEDURE — 3600000004 HC SURGERY LEVEL 4 BASE: Performed by: STUDENT IN AN ORGANIZED HEALTH CARE EDUCATION/TRAINING PROGRAM

## 2024-02-13 PROCEDURE — 2580000003 HC RX 258: Performed by: INTERNAL MEDICINE

## 2024-02-13 PROCEDURE — 3700000001 HC ADD 15 MINUTES (ANESTHESIA): Performed by: STUDENT IN AN ORGANIZED HEALTH CARE EDUCATION/TRAINING PROGRAM

## 2024-02-13 PROCEDURE — A4217 STERILE WATER/SALINE, 500 ML: HCPCS | Performed by: STUDENT IN AN ORGANIZED HEALTH CARE EDUCATION/TRAINING PROGRAM

## 2024-02-13 PROCEDURE — 3700000000 HC ANESTHESIA ATTENDED CARE: Performed by: STUDENT IN AN ORGANIZED HEALTH CARE EDUCATION/TRAINING PROGRAM

## 2024-02-13 PROCEDURE — 2580000003 HC RX 258: Performed by: HOSPITALIST

## 2024-02-13 PROCEDURE — 0JCK3ZZ EXTIRPATION OF MATTER FROM LEFT HAND SUBCUTANEOUS TISSUE AND FASCIA, PERCUTANEOUS APPROACH: ICD-10-PCS | Performed by: STUDENT IN AN ORGANIZED HEALTH CARE EDUCATION/TRAINING PROGRAM

## 2024-02-13 PROCEDURE — 6360000002 HC RX W HCPCS: Performed by: PHYSICIAN ASSISTANT

## 2024-02-13 PROCEDURE — 36415 COLL VENOUS BLD VENIPUNCTURE: CPT

## 2024-02-13 PROCEDURE — 6370000000 HC RX 637 (ALT 250 FOR IP): Performed by: INTERNAL MEDICINE

## 2024-02-13 PROCEDURE — 2580000003 HC RX 258: Performed by: STUDENT IN AN ORGANIZED HEALTH CARE EDUCATION/TRAINING PROGRAM

## 2024-02-13 PROCEDURE — 2500000003 HC RX 250 WO HCPCS: Performed by: NURSE ANESTHETIST, CERTIFIED REGISTERED

## 2024-02-13 PROCEDURE — 2580000003 HC RX 258: Performed by: PHYSICIAN ASSISTANT

## 2024-02-13 PROCEDURE — 3600000014 HC SURGERY LEVEL 4 ADDTL 15MIN: Performed by: STUDENT IN AN ORGANIZED HEALTH CARE EDUCATION/TRAINING PROGRAM

## 2024-02-13 PROCEDURE — 6360000002 HC RX W HCPCS: Performed by: HOSPITALIST

## 2024-02-13 PROCEDURE — 7100000000 HC PACU RECOVERY - FIRST 15 MIN: Performed by: STUDENT IN AN ORGANIZED HEALTH CARE EDUCATION/TRAINING PROGRAM

## 2024-02-13 RX ORDER — LIDOCAINE HYDROCHLORIDE 20 MG/ML
INJECTION, SOLUTION INFILTRATION; PERINEURAL PRN
Status: DISCONTINUED | OUTPATIENT
Start: 2024-02-13 | End: 2024-02-13 | Stop reason: SDUPTHER

## 2024-02-13 RX ORDER — TRAMADOL HYDROCHLORIDE 50 MG/1
50 TABLET ORAL EVERY 4 HOURS PRN
Qty: 42 TABLET | Refills: 0 | Status: SHIPPED | OUTPATIENT
Start: 2024-02-13 | End: 2024-02-20

## 2024-02-13 RX ORDER — ONDANSETRON 2 MG/ML
4 INJECTION INTRAMUSCULAR; INTRAVENOUS
Status: DISCONTINUED | OUTPATIENT
Start: 2024-02-13 | End: 2024-02-13 | Stop reason: HOSPADM

## 2024-02-13 RX ORDER — ACETAMINOPHEN 500 MG
1000 TABLET ORAL 3 TIMES DAILY PRN
Qty: 180 TABLET | Refills: 0 | Status: SHIPPED | OUTPATIENT
Start: 2024-02-13

## 2024-02-13 RX ORDER — TRAMADOL HYDROCHLORIDE 50 MG/1
50 TABLET ORAL EVERY 6 HOURS PRN
Status: DISCONTINUED | OUTPATIENT
Start: 2024-02-13 | End: 2024-02-13 | Stop reason: HOSPADM

## 2024-02-13 RX ORDER — IBUPROFEN 800 MG/1
800 TABLET ORAL 2 TIMES DAILY PRN
Qty: 180 TABLET | Refills: 1 | Status: SHIPPED | OUTPATIENT
Start: 2024-02-13

## 2024-02-13 RX ORDER — DOXYCYCLINE HYCLATE 100 MG
100 TABLET ORAL 2 TIMES DAILY
Qty: 20 TABLET | Refills: 0 | Status: SHIPPED | OUTPATIENT
Start: 2024-02-13 | End: 2024-02-23

## 2024-02-13 RX ORDER — SODIUM CHLORIDE 9 MG/ML
INJECTION, SOLUTION INTRAVENOUS PRN
Status: DISCONTINUED | OUTPATIENT
Start: 2024-02-13 | End: 2024-02-13 | Stop reason: HOSPADM

## 2024-02-13 RX ORDER — LABETALOL HYDROCHLORIDE 5 MG/ML
10 INJECTION, SOLUTION INTRAVENOUS
Status: DISCONTINUED | OUTPATIENT
Start: 2024-02-13 | End: 2024-02-13 | Stop reason: HOSPADM

## 2024-02-13 RX ORDER — SODIUM CHLORIDE 0.9 % (FLUSH) 0.9 %
5-40 SYRINGE (ML) INJECTION PRN
Status: DISCONTINUED | OUTPATIENT
Start: 2024-02-13 | End: 2024-02-13 | Stop reason: HOSPADM

## 2024-02-13 RX ORDER — SODIUM CHLORIDE 0.9 % (FLUSH) 0.9 %
5-40 SYRINGE (ML) INJECTION EVERY 12 HOURS SCHEDULED
Status: DISCONTINUED | OUTPATIENT
Start: 2024-02-13 | End: 2024-02-13 | Stop reason: HOSPADM

## 2024-02-13 RX ORDER — BUPIVACAINE HYDROCHLORIDE 2.5 MG/ML
INJECTION, SOLUTION EPIDURAL; INFILTRATION; INTRACAUDAL PRN
Status: DISCONTINUED | OUTPATIENT
Start: 2024-02-13 | End: 2024-02-13 | Stop reason: HOSPADM

## 2024-02-13 RX ORDER — MAGNESIUM HYDROXIDE 1200 MG/15ML
LIQUID ORAL CONTINUOUS PRN
Status: DISCONTINUED | OUTPATIENT
Start: 2024-02-13 | End: 2024-02-13 | Stop reason: HOSPADM

## 2024-02-13 RX ORDER — ONDANSETRON 2 MG/ML
INJECTION INTRAMUSCULAR; INTRAVENOUS PRN
Status: DISCONTINUED | OUTPATIENT
Start: 2024-02-13 | End: 2024-02-13 | Stop reason: SDUPTHER

## 2024-02-13 RX ORDER — TRAMADOL HYDROCHLORIDE 50 MG/1
100 TABLET ORAL EVERY 6 HOURS PRN
Status: DISCONTINUED | OUTPATIENT
Start: 2024-02-13 | End: 2024-02-13 | Stop reason: HOSPADM

## 2024-02-13 RX ORDER — SODIUM CHLORIDE, SODIUM LACTATE, POTASSIUM CHLORIDE, CALCIUM CHLORIDE 600; 310; 30; 20 MG/100ML; MG/100ML; MG/100ML; MG/100ML
INJECTION, SOLUTION INTRAVENOUS CONTINUOUS PRN
Status: DISCONTINUED | OUTPATIENT
Start: 2024-02-13 | End: 2024-02-13 | Stop reason: SDUPTHER

## 2024-02-13 RX ORDER — PROPOFOL 10 MG/ML
INJECTION, EMULSION INTRAVENOUS PRN
Status: DISCONTINUED | OUTPATIENT
Start: 2024-02-13 | End: 2024-02-13 | Stop reason: SDUPTHER

## 2024-02-13 RX ORDER — METOCLOPRAMIDE HYDROCHLORIDE 5 MG/ML
10 INJECTION INTRAMUSCULAR; INTRAVENOUS
Status: DISCONTINUED | OUTPATIENT
Start: 2024-02-13 | End: 2024-02-13 | Stop reason: HOSPADM

## 2024-02-13 RX ORDER — PROPOFOL 10 MG/ML
INJECTION, EMULSION INTRAVENOUS CONTINUOUS PRN
Status: DISCONTINUED | OUTPATIENT
Start: 2024-02-13 | End: 2024-02-13 | Stop reason: SDUPTHER

## 2024-02-13 RX ADMIN — KETOROLAC TROMETHAMINE 30 MG: 30 INJECTION, SOLUTION INTRAMUSCULAR; INTRAVENOUS at 00:21

## 2024-02-13 RX ADMIN — PROPOFOL 50 MG: 10 INJECTION, EMULSION INTRAVENOUS at 13:27

## 2024-02-13 RX ADMIN — LIDOCAINE HYDROCHLORIDE 100 MG: 20 INJECTION, SOLUTION INFILTRATION; PERINEURAL at 13:25

## 2024-02-13 RX ADMIN — TRAMADOL HYDROCHLORIDE 100 MG: 50 TABLET ORAL at 11:15

## 2024-02-13 RX ADMIN — KETOROLAC TROMETHAMINE 30 MG: 30 INJECTION, SOLUTION INTRAMUSCULAR; INTRAVENOUS at 15:25

## 2024-02-13 RX ADMIN — ONDANSETRON 4 MG: 2 INJECTION INTRAMUSCULAR; INTRAVENOUS at 13:30

## 2024-02-13 RX ADMIN — PROPOFOL 50 MG: 10 INJECTION, EMULSION INTRAVENOUS at 13:25

## 2024-02-13 RX ADMIN — SODIUM CHLORIDE 2000 MG: 900 INJECTION INTRAVENOUS at 13:30

## 2024-02-13 RX ADMIN — DEXMEDETOMIDINE HYDROCHLORIDE 6 MCG: 100 INJECTION, SOLUTION INTRAVENOUS at 13:25

## 2024-02-13 RX ADMIN — DEXMEDETOMIDINE HYDROCHLORIDE 4 MCG: 100 INJECTION, SOLUTION INTRAVENOUS at 13:19

## 2024-02-13 RX ADMIN — SODIUM CHLORIDE, PRESERVATIVE FREE 10 ML: 5 INJECTION INTRAVENOUS at 00:04

## 2024-02-13 RX ADMIN — SODIUM CHLORIDE, SODIUM LACTATE, POTASSIUM CHLORIDE, AND CALCIUM CHLORIDE: .6; .31; .03; .02 INJECTION, SOLUTION INTRAVENOUS at 13:19

## 2024-02-13 RX ADMIN — SODIUM CHLORIDE: 9 INJECTION, SOLUTION INTRAVENOUS at 00:06

## 2024-02-13 RX ADMIN — PROPOFOL 150 MCG/KG/MIN: 10 INJECTION, EMULSION INTRAVENOUS at 13:25

## 2024-02-13 RX ADMIN — KETOROLAC TROMETHAMINE 30 MG: 30 INJECTION, SOLUTION INTRAMUSCULAR; INTRAVENOUS at 07:07

## 2024-02-13 RX ADMIN — VANCOMYCIN HYDROCHLORIDE 2000 MG: 1 INJECTION, POWDER, LYOPHILIZED, FOR SOLUTION INTRAVENOUS at 15:18

## 2024-02-13 RX ADMIN — PROPOFOL 50 MG: 10 INJECTION, EMULSION INTRAVENOUS at 13:32

## 2024-02-13 NOTE — H&P
History and Physical      Name:  Son Palm /Age/Sex: 1985  (38 y.o. male)   MRN & CSN:  [id number] & [id number] Admission Date/Time: 2024  6:47 PM   [facility] PCP: System, Referring Not In (Inactive)       Hospital Day: 1    Assessment and Plan:   Son Palm is a 38 y.o.  male  who presents with Puncture wound of left hand with foreign body    Principal Problem:    Puncture wound of left hand with foreign body  Active Problems:    Puncture wound of left hand, foreign body presence unspecified, initial encounter  Resolved Problems:    * No resolved hospital problems. *       Left Hand Puncture Wound: Accidental injury with a nail gun.  On exam the nail through the proximal aspect of the left 1st metatarsal.  Up-to-date with tetanus shot. n.p.o., nonopiate IV analgesia.  IV fluids.  Orthopedic surgery consulted plan to take the patient to the operative theater in the morning  Open displaced fracture of the shaft of the first metacarpal bone of the left hand: Neurovascular checks every 4 hours.  Plan as per above  History of polysubstance abuse: 3 years sober. Avoid opiate pain medications.  Class I obesity: Counseled on lifestyle modifications prior to discharge    Diet ADULT DIET; Regular   DVT Prophylaxis [] Lovenox, []  Heparin, [] SCDs, [] Ambulation   GI Prophylaxis [] PPI,  [] H2 Blocker,  [] Carafate,  [] Diet/Tube Feeds   Code Status Full Code   Disposition Patient requires continued admission due to nail in hand   MDM [] Low, [] Moderate,[x]  High  Patient's risk as above     History of Present Illness:     Chief Complaint: Puncture wound of left hand with foreign body  Son Palm is a 38 y.o.  male  who presents with a nail in his left hand.  Patient is right-handed.  He was working on constructing a fence.  Pain is relatively mild.  Pain is well-controlled.  He does have some associated numbness and tingling of the thumb.  At present patient is resting comfortably and

## 2024-02-13 NOTE — PROGRESS NOTES
PACU Transfer to [facility]    Vitals:    02/13/24 1445   BP: 111/66   Pulse: 63   Resp: 16   Temp: 97.8 °F (36.6 °C)   SpO2: 97%         Intake/Output Summary (Last 24 hours) at 2/13/2024 1452  Last data filed at 2/13/2024 1445  Gross per 24 hour   Intake 1080 ml   Output --   Net 1080 ml       Pain assessment:  present - adequately treated  Pain Level: 0    Patient transferred to care of Vamshi LLAMAS.    2/13/2024 2:52 PM

## 2024-02-13 NOTE — PROGRESS NOTES
Patient received to room 3311 from ED. Patient A&Ox4 upon arrival. VSS. Nail in left thumb remains in place, no drainage noted. Patient oriented to room, staff, and call system. Educated on fall protocol and hourly rounding. Assessment as documented. Admission navigator complete. IVF infusing. Bed locked in low position. Patient informed to utilize call light with any needs. Pt verbalized understanding. Call light within reach. Will continue to monitor.

## 2024-02-13 NOTE — OP NOTE
incision both proximal and distal to the nail was made with scalpel.  I was then able to use pliers to pull the nail out retrograde.  The one jeniffer was in the soft tissue and able to be removed.  There was another jeniffer which was in the bone which remained intraosseous.  I felt the morbidity of removing this exceeded the benefit from doing so and therefore this was confirmed to be intraosseous with no prominence fluoroscopy and by inspection and plan to leave this intraosseous.  We thoroughly irrigated the wound with saline.  Hemostasis was ensured.  The extensor tendon was examined and noted to be intact.  He had active interphalangeal thumb flexion with tenodesis and therefore I felt exploring the flexor tendon was necessary.  The wound was closed with 3-0 Monocryl.  The patient was placed in a thumb spica splint due to the nondisplaced proximal phalanx fracture.  He was awoken from anesthesia and taken to PACU in stable condition.    Postop plan for pain control with Tylenol ibuprofen and tramadol as needed.  Thumb spica splint to remain on for 2 weeks clean and dry.   He will also be on a week of oral antibiotics which is doxycycline. He should follow-up with me in about 2 weeks for wound check.     Electronically signed by Fahad Flood MD on 2/13/2024 at 3:02 PM

## 2024-02-13 NOTE — ED PROVIDER NOTES
ED Attending Attestation Note     Date of evaluation: 2/12/2024    This patient was seen by the resident.  I have seen and examined the patient, agree with the workup, evaluation, management and diagnosis. The care plan has been discussed.  My assessment reveals a 38-year-old right-hand-dominant male who presents to the emergency department with concern for left hand injury.  Patient was using a nail gun earlier today at a construction site when he excellently fired the device through his left thumb.  This occurred at approximately noon today.  Tetanus is up-to-date.  Patient denies any loss of sensation in this digit.  Denies any additional injury.  Denies any intentional injury.    On examination fine adult male, speaking in complete sentences.  No increased work of breathing or accessory muscle use during respiration.  Patient is neurovascular intact distally to the injury of the left thumb.  There is a metallic foreign body consistent with a nail sticking out through his thumb with no active bleeding.    X-rays do show that there is going through the bone with concurrent fracture.    Will discuss with orthopedic surgery/hand surgery to determine whether this can remain at our hospital versus transfer to outside facility.  Will treat with IV antibiotics.    Deuce Lee MD MPH   Physician.        Deuce Lee MD  02/12/24 5979    
tissues of the palm. There is an associated fracture of the first metacarpal. There is no dislocation. The joint spaces are  normal.    Because the nail is barbed it cannot be removed bedside and requires OR intervention.    Pain improved with Tylenol/Ibuprofen.    Consulting Orthopedics. No hand specialist on per review of schedule. [DA]   2141 Discussed the patient with our orthopedic surgery team.  Will take the patient to the OR tomorrow.  Recommending Ancef for antibiotic prophylaxis.  Will admit the patient. [DA]      ED Course User Index  [DA] Vikas Martinez MD       The patient was given the following medications:  Orders Placed This Encounter   Medications    ibuprofen (ADVIL;MOTRIN) tablet 800 mg    acetaminophen (TYLENOL) tablet 1,000 mg    ibuprofen (ADVIL;MOTRIN) tablet 600 mg    ceFAZolin (ANCEF) 2,000 mg in sodium chloride 0.9 % 50 mL IVPB (mini-bag)     Order Specific Question:   Antimicrobial Indications     Answer:   Surgical Prophylaxis       CONSULTS:  IP CONSULT TO ORTHOPEDIC SURGERY    Review of Systems     Review of Systems    Past Medical, Surgical, Family, and Social History     He has a past medical history of Chlamydia, Drug abuse in remission (HCC), Hypertension, MRSA (methicillin resistant staph aureus) culture positive, and TB (tuberculosis).  He has a past surgical history that includes hernia repair and Tonsillectomy.  His family history is not on file.  He reports that he has been smoking cigars. He has never used smokeless tobacco. He reports current alcohol use. He reports that he does not currently use drugs after having used the following drugs: Marijuana (Weed), IV, Cocaine, and Methamphetamines (Crystal Meth).    Medications     Previous Medications    DOCUSATE SODIUM (COLACE) 100 MG CAPSULE    Take 1 capsule by mouth 2 times daily       Allergies     He is allergic to erythromycin and sulfa antibiotics.    Physical Exam     INITIAL VITALS: BP: (!) 151/94, Temp: 98 °F (36.7

## 2024-02-13 NOTE — PROGRESS NOTES
1420 Pt arrived calm denies pain no SS of pain or nausea report received from CRNA REMOVAL FOREIGN BODY

## 2024-02-13 NOTE — ANESTHESIA PRE PROCEDURE
Department of Anesthesiology  Preprocedure Note       Name:  Son Palm   Age:  38 y.o.  :  1985                                          MRN:  [id number]         Date:  2024      Surgeon: Surgeon(s):  Fahad Flood MD    Procedure: Procedure(s):  REMOVAL FOREIGN BODY LEFT HAND    Medications prior to admission:   Prior to Admission medications    Medication Sig Start Date End Date Taking? Authorizing Provider   docusate sodium (COLACE) 100 MG capsule Take 1 capsule by mouth 2 times daily 10/15/20   Lucila Padilla PA       Current medications:    Current Facility-Administered Medications   Medication Dose Route Frequency Provider Last Rate Last Admin   • ceFAZolin (ANCEF) 2,000 mg in sodium chloride 0.9 % 50 mL IVPB (mini-bag)  2,000 mg IntraVENous On Call to OR Josemanuel Oliver PA       • traMADol (ULTRAM) tablet 50 mg  50 mg Oral Q6H PRN Jazzmine Urena MD        Or   • traMADol (ULTRAM) tablet 100 mg  100 mg Oral Q6H PRN Jazzmine Urena MD   100 mg at 24 1115   • vancomycin (VANCOCIN) 2,000 mg in sodium chloride 0.9 % 500 mL IVPB  2,000 mg IntraVENous Once Jazzmine Urena MD       • [START ON 2024] vancomycin (VANCOCIN) 1,500 mg in sodium chloride 0.9 % 250 mL IVPB  1,500 mg IntraVENous Q12H Jazzmine Urena MD       • 0.9 % sodium chloride infusion   IntraVENous Continuous Félix Carney DO 75 mL/hr at 24 0006 New Bag at 24 0006   • sodium chloride flush 0.9 % injection 5-40 mL  5-40 mL IntraVENous 2 times per day Félix Carney DO   10 mL at 24 0004   • sodium chloride flush 0.9 % injection 5-40 mL  5-40 mL IntraVENous PRN Félix Carney DO       • 0.9 % sodium chloride infusion   IntraVENous PRN Félix Carney DO       • potassium chloride (KLOR-CON M) extended release tablet 40 mEq  40 mEq Oral PRN Félix Carney DO        Or   • potassium bicarb-citric acid (EFFER-K) effervescent tablet 40 mEq  40 mEq Oral PRN Félix Carney, DO        Or   •

## 2024-02-13 NOTE — PLAN OF CARE
Problem: Discharge Planning  Goal: Discharge to home or other facility with appropriate resources  Outcome: Progressing  Flowsheets (Taken 2/13/2024 0419)  Discharge to home or other facility with appropriate resources:   Identify barriers to discharge with patient and caregiver   Arrange for needed discharge resources and transportation as appropriate   Identify discharge learning needs (meds, wound care, etc)   Arrange for interpreters to assist at discharge as needed   Refer to discharge planning if patient needs post-hospital services based on physician order or complex needs related to functional status, cognitive ability or social support system     Problem: Pain  Goal: Verbalizes/displays adequate comfort level or baseline comfort level  Outcome: Progressing  Flowsheets (Taken 2/13/2024 0419)  Verbalizes/displays adequate comfort level or baseline comfort level:   Encourage patient to monitor pain and request assistance   Assess pain using appropriate pain scale   Administer analgesics based on type and severity of pain and evaluate response   Implement non-pharmacological measures as appropriate and evaluate response   Consider cultural and social influences on pain and pain management   Notify Licensed Independent Practitioner if interventions unsuccessful or patient reports new pain

## 2024-02-13 NOTE — ANESTHESIA POSTPROCEDURE EVALUATION
Department of Anesthesiology  Postprocedure Note    Patient: Son Palm  MRN: [id number]  YOB: 1985  Date of evaluation: 2/13/2024    Procedure Summary       Date: 02/13/24 Room / Location: [address]    Anesthesia Start: 1322 Anesthesia Stop: 1423    Procedure: REMOVAL FOREIGN BODY LEFT HAND (Left) Diagnosis:       Hand abscess      (Hand abscess [L02.519])    Surgeons: Fahad Flood MD Responsible Provider: Timothy Mendes MD    Anesthesia Type: general ASA Status: 2            Anesthesia Type: No value filed.    Sloane Phase I: Sloane Score: 10    Sloane Phase II:      Anesthesia Post Evaluation    Patient location during evaluation: PACU  Patient participation: complete - patient participated  Level of consciousness: awake  Airway patency: patent  Nausea & Vomiting: no nausea and no vomiting  Cardiovascular status: blood pressure returned to baseline and hemodynamically stable  Respiratory status: acceptable  Hydration status: euvolemic  Multimodal analgesia pain management approach  Pain management: adequate    No notable events documented.

## 2024-02-13 NOTE — CONSULTS
Consultant: Fahad Flood MD  From: ED  Reason: nail in finger    Chief Complaint   Patient presents with    Finger Injury     Nail in L thumb from leonor gun. Up to date on tetanus         History of Present Illness      Son Palm is a 38 y.o. male who is right-handed who presented to the emergency department after work and once he put a nail through his left thumb with a nail gun.  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  He has a history of opioid use abuse and has been in recovery for a few years and been doing well with this.      Past History       Past Medical History:   Diagnosis Date    Chlamydia 07/31/2016    Drug abuse in remission (HCC)     In remission since early 2021    Hypertension     MRSA (methicillin resistant staph aureus) culture positive     TB (tuberculosis)     s/p 9 mo treatment after asymptomatic positive Ab testing at age 14, no repeat positives per patient     Past Surgical History:   Procedure Laterality Date    HERNIA REPAIR      TONSILLECTOMY       History reviewed. No pertinent family history.  Social History     Tobacco Use    Smoking status: Every Day     Types: Cigars    Smokeless tobacco: Never    Tobacco comments:     vapor smoker   Substance Use Topics    Alcohol use: Yes     Comment: rarely      No current facility-administered medications on file prior to encounter.     Current Outpatient Medications on File Prior to Encounter   Medication Sig Dispense Refill    docusate sodium (COLACE) 100 MG capsule Take 1 capsule by mouth 2 times daily 30 capsule 0      Erythromycin and Sulfa antibiotics    Review of Systems      10-point ROS is negative other than what's mentioned in HPI.    Physical Exam        /73   Pulse 78   Temp 97.8 °F (36.6 °C) (Oral)   Resp 18   Ht 1.702 m (5' 7\")   Wt 99.8 kg (220 lb)   SpO2 95%   BMI 34.46 kg/m²      Constitutional:       General: He is not in acute distress.     Appearance: Normal appearance.   Cardiovascular:      Rate and

## 2024-02-13 NOTE — PROGRESS NOTES
Pt discharged home with family. Pt verbalized understanding of discharge instructions, medication schedule and follow up appointments.

## 2024-02-13 NOTE — PROGRESS NOTES
4 Eyes Skin Assessment     NAME:  Son Palm  YOB: 1985  MEDICAL RECORD NUMBER:  [id number]    The patient is being assessed for  Admission    I agree that at least one RN has performed a thorough Head to Toe Skin Assessment on the patient. ALL assessment sites listed below have been assessed.      Areas assessed by both nurses:    Head, Face, Ears, Shoulders, Back, Chest, Arms, Elbows, Hands, Sacrum. Buttock, Coccyx, Ischium, Legs. Feet and Heels, and Under Medical Devices         Does the Patient have a Wound? Yes wound(s) were present on assessment. LDA wound assessment was Initiated and completed by RN       Vic Prevention initiated by RN: No  Wound Care Orders initiated by RN: No    Pressure Injury (Stage 3,4, Unstageable, DTI, NWPT, and Complex wounds) if present, place Wound referral order by RN under : No    New Ostomies, if present place, Ostomy referral order under : No     Nurse 1 eSignature: Electronically signed by Lizbet Hobson RN on 2/13/24 at 3:06 AM EST    **SHARE this note so that the co-signing nurse can place an eSignature**    Nurse 2 eSignature: Electronically signed by Gloria Lino RN on 2/13/24 at 3:07 AM EST

## 2024-02-13 NOTE — PROGRESS NOTES
[place]2.[place]    [facility] [place]      Name:  Son Palm /Age/Sex: 1985  (38 y.o. male)   MRN & CSN:  [id number] & [id number] Encounter Date/Time: 2024 7:53 AM EST   Location:  [address] [place] [address] PCP: System, Referring Not In (Inactive)     Attending:Jazzmine Urena MD       Hospital Day: 2    Assessment and Recommendations   Son Palm is a 38 y.o. male with pmh of polysubstance abuse who presents with Puncture wound of left hand with foreign body with a nail gun      Plan:     Left hand puncture wound with nail with open displaced fracture of the first metacarpal bone  -Accidental needle injury  -X-ray shows 4 x 0.2 nail extending through the first metacarpal associated with fracture  -Continue pain medication  Orthopedic consulted-  - OR today  -Patient has a history of MRSA infection will add vancomycin      History of polysubstance abuse  -Patient requesting better pain control without opiates  -Discussed with patient about tramadol which is least addictive.  He is agreeable to it  -Continue Toradol    Diet Diet NPO   DVT Prophylaxis [x] Lovenox, []  Heparin, [] SCDs, [] Ambulation,  [] Eliquis, [] Xarelto  [] Coumadin   Code Status Full Code   Disposition From: Home  Expected Disposition: Home  Estimated Date of Discharge: Unclear  Patient requires continued admission due to need surgery   Surrogate Decision Maker/ POA       Personally reviewed Lab Studies and Imaging       Imaging that was interpreted personally includes x-ray hand and results Neuralex ending into the first metacarpal bone with fracture    Drugs that require monitoring for toxicity include vancomycin and the method of monitoring was vancomycin level        Subjective:     Chief Complaint:     Son Palm is a 38 y.o. male who presents with nail in the bone  Patient complaining of a lot of pain.    Review of Systems:      Pertinent positives and negatives discussed in HPI    Objective:     Intake/Output Summary (Last 24 hours)

## 2024-02-13 NOTE — CARE COORDINATION
CM met with patient and girlfriend at bedside. Pt is independent, uses no DME or services. Pt states his girlfriend or another family member will transport him home at discharge. No CM needs identified.     Nichol Ellsworth RN, BSN,   Case Management Department  [phone]

## 2024-02-13 NOTE — DISCHARGE INSTRUCTIONS
Please keep splint clean and dry until follow up    Take antibiotics, ibuprofen as prescribed, tylenol and tramadol available as needed for pain control    Follow up 2/28 to check healing

## 2024-02-13 NOTE — CONSULTS
[facility] -  Clinical Pharmacy Note    Vancomycin - Management by Pharmacy    Consult Date(s): 2/13/24  Consulting Provider(s): Dr. Urena    Assessment / Plan  1)  Left hand puncture wound w/ open displaced fracture and foreign body - Vancomycin  Concurrent Antimicrobials: Cefazolin  Day of Vanc Therapy:  day #1  Current Dosing Method: Bayesian-Guided AUC Dosing  Therapeutic Goal: -600 mg/L*hr  Current Dose / Plan:   Will give 2000mg IV x1 loading dose, followed  by 1500mg IV q12h.    Regimen predicts an AUC = 529 with trough = 14.2 mcg/mL.  Will plan to check level 2/15 AM.  Will continue to monitor clinical condition and make adjustments to regimen as appropriate.    Please call with questions--  Thanks--  Esperanza Damian, PharmD, BCPS, BCGP  [id number] ([facility])   2/13/2024 12:00 PM      Interval update:  Ortho consulted - possible surgery today.  Vanc added today for h/o MRSA.    Subjective/Objective:   Son Palm is a 38 y.o. male with a PMHx significant for polysubstance abuse (remission since 2021), HTN, MRSA infection who is admitted with left hand puncture wound after accidental injury with a nail gun - has nail through the proximal aspect of the left 1st metatarsal with open displaced fracture of the shaft of the left first metacarpal bone..     Pharmacy is consulted to dose Vancomycin.    Ht Readings from Last 1 Encounters:   02/12/24 1.702 m (5' 7\")     Wt Readings from Last 1 Encounters:   02/12/24 99.8 kg (220 lb)     Current & Prior Antimicrobial Regimen(s):  Cefazolin (2/12-current)  Vancomycin - Pharmacy to dose  2000mg IV x1 2/13 12:30  1500mg IV q12h (2/14-current)    Vancomycin Level(s) / Doses:    Date Time Dose Type of Level / Level Interpretation                 Note: Serum levels collected for AUC-based dosing may be high if collected in close proximity to the dose administered. This is not necessarily indicative of toxicity.    Cultures & Sensitivities:    Date

## 2024-02-13 NOTE — ED NOTES
ED TO INPATIENT SBAR HANDOFF    Patient Name: Son Palm   :  1985  38 y.o.   MRN:  [id number]  Preferred Name  Son  ED Room #:  B18/B18-18  Family/Caregiver Present no   Restraints no   Sitter no   Sepsis Risk Score Sepsis Risk Score: 0.95    Situation  Code Status: Full Code No additional code details.    Allergies: Erythromycin and Sulfa antibiotics  Weight: Patient Vitals for the past 96 hrs (Last 3 readings):   Weight   24 1520 99.8 kg (220 lb)     Arrived from: home  Chief Complaint:   Chief Complaint   Patient presents with    Finger Injury     Nail in L thumb from leonor gun. Up to date on tetanus      Hospital Problem/Diagnosis:  Principal Problem:    Puncture wound of left hand with foreign body  Active Problems:    Puncture wound of left hand, foreign body presence unspecified, initial encounter  Resolved Problems:    * No resolved hospital problems. *    Imaging:   XR HAND LEFT (MIN 3 VIEWS)   Final Result   Nail extending through first metacarpal with associated fracture.         XR HAND LEFT (MIN 3 VIEWS)   Final Result   5 cm barbed nail extending through the thumb and volar thenar eminence with   possible osseous fragment off the first metacarpal shaft visualized on one view   only. Recommend a true lateral view perpendicular to the nail head to determine   if the nail traverses bone or a post foreign body removal view to determine   osseous injury. Removal may be painful/difficult due to the barbs on the nail.        Abnormal labs: Abnormal Labs Reviewed - No abnormal labs to display  Critical values: no     Abnormal Assessment Findings:     Background  History:   Past Medical History:   Diagnosis Date    Chlamydia 2016    Drug abuse in remission (HCC)     In remission since early     Hypertension     MRSA (methicillin resistant staph aureus) culture positive     TB (tuberculosis)     s/p 9 mo treatment after asymptomatic positive Ab testing at age 14, no repeat

## 2024-02-14 NOTE — DISCHARGE SUMMARY
SURGICAL PROCEDURES    Result Date: 2/13/2024  XR HAND LEFT (2 VIEWS), FLUORO FOR SURGICAL PROCEDURES Indication: REMOVAL FOREIGN BODY LEFT HAND Findings: Operative fluoroscopy was utilized for localization of a radiopaque foreign body overlying the first metacarpal. Please see the procedure note for further details. Reference Air Kerma 0.16 mGy.     Operative fluoroscopy, as above    XR HAND LEFT (MIN 3 VIEWS)    Result Date: 2/12/2024  Exam: Left hand, 7 views History: Nail in left hand; Please obtain \"true lateral view\" perpendicular to the nail head, pain Comparison: 1540 hours Findings: A 4.5 x 0.2 cm barbed nail extends through the mid shaft of the first metacarpal with tip in the subcutaneous tissues of the palm. There is an associated fracture of the first metacarpal. There is no dislocation. The joint spaces are normal.     Nail extending through first metacarpal with associated fracture.     XR HAND LEFT (MIN 3 VIEWS)    Result Date: 2/12/2024  EXAM: XR HAND LEFT (MIN 3 VIEWS) INDICATION: Nail through base of thumb COMPARISON: None FINDINGS: There is a 5 cm barbed nail extending through the thumb and thenar eminence in the radial to ulnar direction. The nail passes through the first metacarpal shaft with a possible osseous irregularity seen in only the AP view. No true lateral was obtained. There is associated soft tissue swelling.     5 cm barbed nail extending through the thumb and volar thenar eminence with possible osseous fragment off the first metacarpal shaft visualized on one view only. Recommend a true lateral view perpendicular to the nail head to determine if the nail traverses bone or a post foreign body removal view to determine osseous injury. Removal may be painful/difficult due to the barbs on the nail.      CBC:   Recent Labs     02/13/24  0412   WBC 6.4   HGB 14.6        BMP:    Recent Labs     02/13/24  0412      K 3.7      CO2 23   BUN 29*   CREATININE 1.2   GLUCOSE

## (undated) DEVICE — 1010 S-DRAPE TOWEL DRAPE 10/BX: Brand: STERI-DRAPE™

## (undated) DEVICE — HAND: Brand: MEDLINE INDUSTRIES, INC.

## (undated) DEVICE — SOLUTION IV 1000ML 0.9% SOD CHL

## (undated) DEVICE — PREMIUM WET SKIN PREP TRAY: Brand: MEDLINE INDUSTRIES, INC.

## (undated) DEVICE — BANDAGE COMPR W1INXL5YD BGE E ADH TENSOPLAST

## (undated) DEVICE — CABLE BPLR L12FT FLYING LD DISPOSABLE

## (undated) DEVICE — CATHETER IV 18 GAX125 IN WNG INTROCAN SAFETY

## (undated) DEVICE — SUTURE ABSORBABLE MONOFILAMENT 4-0 PS2 18 IN UD PDS + PDP496G

## (undated) DEVICE — GLOVE SURG SZ 8 L12IN FNGR THK79MIL GRN LTX FREE

## (undated) DEVICE — GOWN,SIRUS,POLYRNF,BRTHSLV,XL,30/CS: Brand: MEDLINE

## (undated) DEVICE — DRAPE,HAND,STERILE: Brand: MEDLINE

## (undated) DEVICE — BANDAGE,GAUZE,CONFORMING,3"X75",STRL,LF: Brand: MEDLINE

## (undated) DEVICE — SUTURE ETHLN SZ 3-0 L18IN NONABSORBABLE BLK FS-1 L24MM 3/8 663H

## (undated) DEVICE — GOWN,SIRUS,POLYRNF,BRTHSLV,XLN/XL,20/CS: Brand: MEDLINE

## (undated) DEVICE — PADDING CAST W4INXL4YD SPUN DACRON POLY POR NON STERILE

## (undated) DEVICE — GLOVE ORTHO 7 1/2   MSG9475

## (undated) DEVICE — SUTURE NONABSORBABLE MONOFILAMENT 4-0 PS-2 18 IN BLK ETHILON 1667G

## (undated) DEVICE — TUBING IRRIG L77IN DIA0.241IN L BOR FOR CYSTO W/ NVENT

## (undated) DEVICE — TOWEL,STOP FLAG GOLD N-W: Brand: MEDLINE

## (undated) DEVICE — COVER LT HNDL BLU PLAS

## (undated) DEVICE — ELECTRODE PT RET AD L9FT HI MOIST COND ADH HYDRGEL CORDED

## (undated) DEVICE — GARMENT,MEDLINE,DVT,INT,CALF,MED, GEN2: Brand: MEDLINE

## (undated) DEVICE — SYRINGE IRRIG 60ML SFT PLIABLE BLB EZ TO GRP 1 HND USE W/

## (undated) DEVICE — CORD,CAUTERY,BIPOLAR,STERILE: Brand: MEDLINE

## (undated) DEVICE — SPLINT PLSTR OF PARIS W4XL15IN EXTRA FAST SET GYPS S

## (undated) DEVICE — SOLUTION IRRIG 3000ML 0.9% SOD CHL USP UROMATIC PLAS CONT

## (undated) DEVICE — COUNTER NDL 40 COUNT HLD 70 NUM FOAM BLK SGL MAG W BLDE REMV

## (undated) DEVICE — UNDERGLOVE SURG SZ 8 BLU LTX FREE SYN POLYISOPRENE POLYMER

## (undated) DEVICE — BLADE OPHTH 180DEG CUT SURF BLU STR SHRP DBL BVL GRINDLESS

## (undated) DEVICE — PADDING CAST W4INXL4YD HIGHLY ABSRB THAN COT EZ APPL

## (undated) DEVICE — BLADE,CARBON-STEEL,15,STRL,DISPOSABLE,TB: Brand: MEDLINE

## (undated) DEVICE — BANDAGE,GAUZE,CONFORMING,4"X75",STRL,LF: Brand: MEDLINE

## (undated) DEVICE — TRAY,IRRIGATION,BULBSYRINGE,60ML,CSR,PVP: Brand: MEDLINE

## (undated) DEVICE — CONTAINER,SPECIMEN,PNEU TUBE,3OZ,OR STRL: Brand: MEDLINE